# Patient Record
Sex: MALE | Race: WHITE | NOT HISPANIC OR LATINO | URBAN - METROPOLITAN AREA
[De-identification: names, ages, dates, MRNs, and addresses within clinical notes are randomized per-mention and may not be internally consistent; named-entity substitution may affect disease eponyms.]

---

## 2023-01-22 ENCOUNTER — NURSE TRIAGE (OUTPATIENT)
Dept: ADMINISTRATIVE | Facility: CLINIC | Age: 83
End: 2023-01-22
Payer: OTHER GOVERNMENT

## 2023-01-22 NOTE — TELEPHONE ENCOUNTER
Spoke with wife. New pt-sees Dr. Collazo in out of network urology.     Trouble urinating, states usually has to dilate him. Only able to urinate a few drops each time since last night.     Dispo-ER now for eval. Wife wanting to know if urology is on call in ER. Informed [facility] nurse does not have on call schedule for locations, but assured once triaged & assessed ER physician would reach out to specialty if needed. Wife vu.     Reason for Disposition   [1] Unable to urinate (or only a few drops) > 4 hours AND [2] bladder feels very full (e.g., palpable bladder or strong urge to urinate)    Additional Information   Negative: Shock suspected (e.g., cold/pale/clammy skin, too weak to stand, low BP, rapid pulse)   Negative: Sounds like a life-threatening emergency to the triager    Protocols used: Urinary Symptoms-A-AH